# Patient Record
Sex: FEMALE | Race: BLACK OR AFRICAN AMERICAN | NOT HISPANIC OR LATINO | Employment: FULL TIME | URBAN - METROPOLITAN AREA
[De-identification: names, ages, dates, MRNs, and addresses within clinical notes are randomized per-mention and may not be internally consistent; named-entity substitution may affect disease eponyms.]

---

## 2018-04-19 ENCOUNTER — HOSPITAL ENCOUNTER (EMERGENCY)
Facility: HOSPITAL | Age: 58
Discharge: HOME | End: 2018-04-19
Attending: EMERGENCY MEDICINE
Payer: MEDICAID

## 2018-04-19 VITALS
HEIGHT: 62 IN | RESPIRATION RATE: 16 BRPM | WEIGHT: 135 LBS | DIASTOLIC BLOOD PRESSURE: 77 MMHG | HEART RATE: 71 BPM | TEMPERATURE: 98 F | OXYGEN SATURATION: 100 % | BODY MASS INDEX: 24.84 KG/M2 | SYSTOLIC BLOOD PRESSURE: 117 MMHG

## 2018-04-19 DIAGNOSIS — S00.81XA FACIAL ABRASION, INITIAL ENCOUNTER: ICD-10-CM

## 2018-04-19 DIAGNOSIS — R52 PAIN: ICD-10-CM

## 2018-04-19 DIAGNOSIS — S00.83XA FACIAL CONTUSION, INITIAL ENCOUNTER: Primary | ICD-10-CM

## 2018-04-19 PROCEDURE — 90471 IMMUNIZATION ADMIN: CPT | Performed by: PHYSICIAN ASSISTANT

## 2018-04-19 PROCEDURE — 63600175 PHARM REV CODE 636 W HCPCS: Performed by: PHYSICIAN ASSISTANT

## 2018-04-19 PROCEDURE — 90715 TDAP VACCINE 7 YRS/> IM: CPT | Performed by: PHYSICIAN ASSISTANT

## 2018-04-19 PROCEDURE — 25000003 PHARM REV CODE 250: Performed by: PHYSICIAN ASSISTANT

## 2018-04-19 PROCEDURE — 99283 EMERGENCY DEPT VISIT LOW MDM: CPT

## 2018-04-19 RX ORDER — KETOROLAC TROMETHAMINE 10 MG/1
10 TABLET, FILM COATED ORAL
Status: COMPLETED | OUTPATIENT
Start: 2018-04-19 | End: 2018-04-19

## 2018-04-19 RX ORDER — IBUPROFEN 600 MG/1
600 TABLET ORAL EVERY 6 HOURS PRN
Qty: 20 TABLET | Refills: 0 | Status: SHIPPED | OUTPATIENT
Start: 2018-04-19

## 2018-04-19 RX ADMIN — CLOSTRIDIUM TETANI TOXOID ANTIGEN (FORMALDEHYDE INACTIVATED), CORYNEBACTERIUM DIPHTHERIAE TOXOID ANTIGEN (FORMALDEHYDE INACTIVATED), BORDETELLA PERTUSSIS TOXOID ANTIGEN (GLUTARALDEHYDE INACTIVATED), BORDETELLA PERTUSSIS FILAMENTOUS HEMAGGLUTININ ANTIGEN (FORMALDEHYDE INACTIVATED), BORDETELLA PERTUSSIS PERTACTIN ANTIGEN, AND BORDETELLA PERTUSSIS FIMBRIAE 2/3 ANTIGEN 0.5 ML: 5; 2; 2.5; 5; 3; 5 INJECTION, SUSPENSION INTRAMUSCULAR at 11:04

## 2018-04-19 RX ADMIN — KETOROLAC TROMETHAMINE 10 MG: 10 TABLET, FILM COATED ORAL at 11:04

## 2018-04-19 NOTE — ED NOTES
Left periorbital facial swelling with abrasions to forehead, cheek and upper lip since Sunday after falling down stairs. Denies LOC.   APPEARANCE: Alert, oriented and in no acute distress.  CARDIAC: Normal rate and rhythm, no murmur heard.   PERIPHERAL VASCULAR: peripheral pulses present. Normal cap refill. No edema. Warm to touch.    RESPIRATORY:Normal rate and effort, breath sounds clear bilaterally throughout chest. Respirations are equal and unlabored no obvious signs of distress.  GASTRO: soft, bowel sounds normal, no tenderness, no abdominal distention.  MUSC: Full ROM. No bony tenderness or soft tissue tenderness. No obvious deformity.  SKIN: Skin is warm and dry, normal skin turgor, mucous membranes moist. Abrasions to left forehead, left cheek and upper lip since Sunday after falling down a few stairs.  NEURO: 5/5 strength major flexors/extensors bilaterally. Sensory intact to light touch bilaterally. Aron coma scale: eyes open spontaneously-4, oriented & converses-5, obeys commands-6. No neurological abnormalities.   MENTAL STATUS: awake, alert and aware of environment.  EYE: PERRL, both eyes: pupils brisk and reactive to light. Normal size.  ENT: EARS: no obvious drainage. NOSE: no active bleeding.

## 2018-04-19 NOTE — ED PROVIDER NOTES
Encounter Date: 2018       History     Chief Complaint   Patient presents with    Facial Injury     pt fell off the porch 2 days ago and hit the left side of her face. C/o scabs and bruising to left forehead and under left eye and bruising under both eyes     57 y.o. Female with no reported PMH presents for evaluation of injury. She reports that she had mechanical fall down steps as she was walking down her porch two days ago. She reports that she struck the left side of her face during that time sustaining  small abrasions. She denies any LOC and states pain was initially mild and that she was able to get up without assistance. She began with gradual bruising of the affected side and also reports sammy bruising of the right cheek as well. She state pain is a aching sensation that is exacerbated by palpation. She thought pain would improve by now but she still continues with pain. She denies any headache, dizziness, vision changes, epistasis, change in hearing, dental pain or difficulty swallowing. She denies any additional complaints at this time and has not tried any medications for the symptoms. She is unsure of last tetanus.       The history is provided by the patient.     Review of patient's allergies indicates:  No Known Allergies  History reviewed. No pertinent past medical history.  Past Surgical History:   Procedure Laterality Date     SECTION, CLASSIC       History reviewed. No pertinent family history.  Social History   Substance Use Topics    Smoking status: Current Every Day Smoker     Types: Cigarettes    Smokeless tobacco: Not on file    Alcohol use Yes     Review of Systems   Constitutional: Negative for chills and fever.   HENT: Positive for facial swelling. Negative for ear pain and trouble swallowing.    Eyes: Negative for pain, redness and visual disturbance.   Respiratory: Negative for shortness of breath.    Cardiovascular: Negative for chest pain.   Gastrointestinal: Negative  for nausea and vomiting.   Genitourinary: Negative for flank pain.   Musculoskeletal: Negative for arthralgias, back pain, myalgias, neck pain and neck stiffness.   Skin: Positive for color change and wound. Negative for rash.   Neurological: Negative for dizziness, weakness and headaches.   Hematological: Does not bruise/bleed easily.       Physical Exam     Initial Vitals [04/19/18 1023]   BP Pulse Resp Temp SpO2   117/77 71 16 98.4 °F (36.9 °C) 100 %      MAP       90.33         Physical Exam    Nursing note and vitals reviewed.  Constitutional: Vital signs are normal. She appears well-developed and well-nourished. She is cooperative.  Non-toxic appearance. She does not appear ill. No distress.   HENT:   Head: Head is with raccoon's eyes, with abrasion and with contusion. Head is without laceration.       Nose: No mucosal edema, sinus tenderness, septal deviation or nasal septal hematoma. No epistaxis.   Mouth/Throat:       EOMs intact; no bony tenderness, crepitus or instability. No signs of dental injury. FROM of TMJ    Eyes: Conjunctivae, EOM and lids are normal. Pupils are equal, round, and reactive to light.   Neck: Neck supple. No neck rigidity.   Cardiovascular: Normal rate and regular rhythm.   Pulmonary/Chest: Breath sounds normal. No respiratory distress. She has no wheezes. She has no rhonchi.   Abdominal: Soft. Normal appearance and bowel sounds are normal. There is no tenderness. There is no rigidity and no guarding.   Musculoskeletal: Normal range of motion.   Neurological: She is alert and oriented to person, place, and time. She has normal strength. No cranial nerve deficit or sensory deficit. Gait normal. GCS eye subscore is 4. GCS verbal subscore is 5. GCS motor subscore is 6.   Skin: Skin is warm and dry. Abrasion, bruising and ecchymosis noted. No rash noted.   Psychiatric: She has a normal mood and affect. Her speech is normal and behavior is normal. Thought content normal.         ED Course    Procedures  Labs Reviewed - No data to display      Imaging Results          X-Ray Facial Bones  3 Or More View (Final result)  Result time 04/19/18 11:35:37    Final result by Kim Sage MD (04/19/18 11:35:37)                 Impression:      No abnormality seen      Electronically signed by: Kim Sage MD  Date:    04/19/2018  Time:    11:35             Narrative:    EXAMINATION:  XR FACIAL BONES 3 OR MORE VIEW    CLINICAL HISTORY:  Pain, unspecified    TECHNIQUE:  Four views of the facial bones were performed.    COMPARISON:  None    FINDINGS:  No fracture, no osseous lesions.  The soft tissues appear normal.                                     Medical Decision Making:   Initial Assessment:   Well appearing female presents for evaluation of facial pain S/P trip and fall two days ago. She reports pain to the left face with noted abrasions and ecchymosis. No obvious bony deformity, bony tenderness on exam or crepitus. FROM of all articulations.   Differential Diagnosis:   Fracture, contusion, abrasion  Clinical Tests:   Radiological Study: Ordered and Reviewed  ED Management:  Ice and Toradol in the ED with subjective improvement in pain. Tdap updated in the ED. NO CT warranted at this time as per [place] CT rule. Low suspicion of acute bony abnormality however as she does have some edema and ecchymosis noted X-ray of facial bones ordered with no acute bony abnormality. Patient was encouraged to continue RICE therapy; instructed on wound care and NSAID's for pain and swelling. She was given head injury precautions although less likely as incident has now occurred over 48 hrs ago and patient with no focal neurodeficit. Strict instructions to follow up with primary care physician or reference provided for further assessment and evaluation. Given instructions to return for any acute symptoms and verbalized understanding of this medical plan.                Attending Attestation:     Physician  Attestation Statement for NP/PA:   I discussed this assessment and plan of this patient with the NP/PA, but I did not personally examine the patient. The face to face encounter was performed by the NP/PA.                     Clinical Impression:   The primary encounter diagnosis was Facial contusion, initial encounter. Diagnoses of Pain and Facial abrasion, initial encounter were also pertinent to this visit.                           MILLY Iqbal  04/24/18 [id number]       Braxton Bailey MD  04/26/18 5703

## 2022-02-13 ENCOUNTER — HOSPITAL ENCOUNTER (EMERGENCY)
Facility: HOSPITAL | Age: 62
Discharge: HOME | End: 2022-02-13
Attending: EMERGENCY MEDICINE
Payer: MEDICAID

## 2022-02-13 VITALS
DIASTOLIC BLOOD PRESSURE: 61 MMHG | TEMPERATURE: 98 F | OXYGEN SATURATION: 100 % | SYSTOLIC BLOOD PRESSURE: 100 MMHG | WEIGHT: 140 LBS | BODY MASS INDEX: 25.76 KG/M2 | HEART RATE: 68 BPM | RESPIRATION RATE: 16 BRPM | HEIGHT: 62 IN

## 2022-02-13 DIAGNOSIS — R22.0 LIP SWELLING: ICD-10-CM

## 2022-02-13 DIAGNOSIS — T78.40XA ALLERGIC REACTION, INITIAL ENCOUNTER: ICD-10-CM

## 2022-02-13 PROCEDURE — 96374 THER/PROPH/DIAG INJ IV PUSH: CPT

## 2022-02-13 PROCEDURE — 25000003 PHARM REV CODE 250: Performed by: EMERGENCY MEDICINE

## 2022-02-13 PROCEDURE — 99283 PR EMERGENCY DEPT VISIT,LEVEL III: ICD-10-PCS | Mod: ,,, | Performed by: EMERGENCY MEDICINE

## 2022-02-13 PROCEDURE — 96375 TX/PRO/DX INJ NEW DRUG ADDON: CPT

## 2022-02-13 PROCEDURE — 99283 EMERGENCY DEPT VISIT LOW MDM: CPT | Mod: ,,, | Performed by: EMERGENCY MEDICINE

## 2022-02-13 PROCEDURE — 63600175 PHARM REV CODE 636 W HCPCS: Performed by: EMERGENCY MEDICINE

## 2022-02-13 PROCEDURE — 99284 EMERGENCY DEPT VISIT MOD MDM: CPT | Mod: 25

## 2022-02-13 RX ORDER — METHYLPREDNISOLONE SOD SUCC 125 MG
125 VIAL (EA) INJECTION
Status: COMPLETED | OUTPATIENT
Start: 2022-02-13 | End: 2022-02-13

## 2022-02-13 RX ORDER — FAMOTIDINE 20 MG/1
20 TABLET, FILM COATED ORAL
Status: COMPLETED | OUTPATIENT
Start: 2022-02-13 | End: 2022-02-13

## 2022-02-13 RX ORDER — DIPHENHYDRAMINE HYDROCHLORIDE 50 MG/ML
25 INJECTION INTRAMUSCULAR; INTRAVENOUS
Status: COMPLETED | OUTPATIENT
Start: 2022-02-13 | End: 2022-02-13

## 2022-02-13 RX ORDER — METHYLPREDNISOLONE 4 MG/1
TABLET ORAL
Qty: 1 EACH | Refills: 0 | Status: SHIPPED | OUTPATIENT
Start: 2022-02-13

## 2022-02-13 RX ADMIN — METHYLPREDNISOLONE SODIUM SUCCINATE 125 MG: 125 INJECTION, POWDER, FOR SOLUTION INTRAMUSCULAR; INTRAVENOUS at 03:02

## 2022-02-13 RX ADMIN — DIPHENHYDRAMINE HYDROCHLORIDE 25 MG: 50 INJECTION INTRAMUSCULAR; INTRAVENOUS at 03:02

## 2022-02-13 RX ADMIN — FAMOTIDINE 20 MG: 20 TABLET ORAL at 03:02

## 2022-02-13 NOTE — Clinical Note
"Nora Denny AISHA" Noe was seen and treated in our emergency department on 2/13/2022.  She may return to work on 02/14/2022.       If you have any questions or concerns, please don't hesitate to call.      Nova Bangura RN    "

## 2022-02-13 NOTE — ED TRIAGE NOTES
Patient presents to the ER with complaints of lip swelling after having a piece of sausage about an hour ago. Patient denies any throat swelling or shortness of breath. Patient denies any pain at this time.

## 2022-02-13 NOTE — ED PROVIDER NOTES
Encounter Date: 2022    SCRIBE #1 NOTE: I, Kaitlin Ludwig, am scribing for, and in the presence of,  Danny Caldwell III, MD. I have scribed the following portions of the note - Other sections scribed: HPI, ROS.       History     Chief Complaint   Patient presents with    Allergic Reaction     Pt reports lower lip swelling after eating smoked sausage.  Pt denies dyspnea, dysphagia.     Ms. Liu is a 61-year-old who presents by personal transportation.  She complains of swelling to her lower lip that began approximately 30 minutes after eating smoked sausage.  The onset was an hour ago.  She has no history of food or medication allergies.  She denies tongue swelling, throat discomfort, dysphagia, cough, shortness of breath, wheezing, abdominal pain, nausea, and vomiting.  She did not take Benadryl or other medications prior to coming to the ED.  there are no modifying factors.    She has no past medical history on file.    The history is provided by the patient. No  was used.     Review of patient's allergies indicates:  No Known Allergies  History reviewed. No pertinent past medical history.  Past Surgical History:   Procedure Laterality Date     SECTION       SECTION, CLASSIC       History reviewed. No pertinent family history.  Social History     Tobacco Use    Smoking status: Current Every Day Smoker     Types: Cigarettes    Smokeless tobacco: Never Used   Substance Use Topics    Alcohol use: Yes     Review of Systems   HENT: Negative for sore throat and trouble swallowing.         Positive for swelling to the lower lip.   Respiratory: Negative for shortness of breath.    Gastrointestinal: Negative for abdominal pain, nausea and vomiting.   Musculoskeletal: Negative for arthralgias and myalgias.   Skin: Negative for rash.   Neurological: Negative for dizziness, light-headedness and headaches.       Physical Exam     Initial Vitals [22 1415]   BP Pulse Resp Temp  SpO2   138/80 70 16 97.9 °F (36.6 °C) 100 %      MAP       --         Physical Exam    Nursing note and vitals reviewed.  Constitutional: She is not diaphoretic. No distress.   HENT:   Head: Normocephalic and atraumatic.   Mouth/Throat: Uvula is midline and oropharynx is clear and moist. No oral lesions. No trismus in the jaw. No uvula swelling.   There is mild swelling without tenderness to the lower lip.  There is no swelling of the tongue or sublingual space.   Neck: No stridor present.   Cardiovascular: Normal rate, regular rhythm and normal heart sounds. Exam reveals no gallop and no friction rub.    No murmur heard.  Pulmonary/Chest: No stridor. No respiratory distress. She has no wheezes. She has no rhonchi. She has no rales.   Musculoskeletal:      Cervical back: No edema.     Neurological: She is alert and oriented to person, place, and time. GCS score is 15. GCS eye subscore is 4. GCS verbal subscore is 5. GCS motor subscore is 6.   Skin: Skin is warm and dry. No pallor.         ED Course   Procedures  Labs Reviewed - No data to display       Imaging Results    None          Medications   diphenhydrAMINE injection 25 mg (25 mg Intravenous Given 2/13/22 1524)   famotidine tablet 20 mg (20 mg Oral Given 2/13/22 1525)   methylPREDNISolone sodium succinate injection 125 mg (125 mg Intravenous Given 2/13/22 1524)     Medical Decision Making:   Clinical Tests:   Lab Tests: Ordered and Reviewed    MDM:  The patient underwent evaluation for acute swelling to her lower lip after eating a sausage.  She had no concerning symptoms for airway involvement.  She was treated with IV Benadryl, Pepcid, and Solu-Medrol.  She had improvement in her symptoms and was discharged.  She was prescribed a Medrol Dosepak discharging referred to Allergy and immunology for further evaluation.          Scribe Attestation:   Scribe #1: I performed the above scribed service and the documentation accurately describes the services I  performed. I attest to the accuracy of the note.    Attending Attestation:             Attending ED Notes:   Portions of this chart were completed by the scribe by interpretive transcription of statements made by the patient as a result of my questions at the bedside. Other portions were completed by the scribe from statements made by me for direct transcription into the medical record. Following completion of the charting by the scribe, I made modifications for both correctness and proper phrasing.      ED Course as of 02/14/22 0923   Sun Feb 13, 2022   1642 Subjectively, she feels that the swelling to her lip is improving.  It looks less swollen on exam as well. [LP]      ED Course User Index  [LP] Danny Caldwell III, MD             Clinical Impression:   Final diagnoses:  [T78.40XA] Allergic reaction, initial encounter  [R22.0] Lip swelling          ED Disposition Condition    Discharge Stable        ED Prescriptions     Medication Sig Dispense Start Date End Date Auth. Provider    methylPREDNISolone (MEDROL DOSEPACK) 4 mg tablet Follow dosing schedule on packaging. 1 each 2/13/2022  Danny Caldwell III, MD        Follow-up Information     Follow up With Specialties Details Why Contact Info    Judd Duarte III, MD Orthopedic Surgery  Schedule close follow-up with primary care provider. [address] [facility]  [facility] [id number] [phone]      [facility] Dept Emergency Medicine  Return to the ER as needed for any concerns. [id number]  [facility] [address] [phone]           Danny Caldwell III, MD  02/14/22 0907

## 2023-05-18 ENCOUNTER — HOSPITAL ENCOUNTER (EMERGENCY)
Facility: HOSPITAL | Age: 63
Discharge: HOME | End: 2023-05-18
Attending: EMERGENCY MEDICINE
Payer: MEDICAID

## 2023-05-18 VITALS
HEIGHT: 62 IN | DIASTOLIC BLOOD PRESSURE: 67 MMHG | BODY MASS INDEX: 23.92 KG/M2 | HEART RATE: 71 BPM | SYSTOLIC BLOOD PRESSURE: 108 MMHG | TEMPERATURE: 99 F | RESPIRATION RATE: 16 BRPM | OXYGEN SATURATION: 97 % | WEIGHT: 130 LBS

## 2023-05-18 DIAGNOSIS — K86.9 PANCREATIC LESION: ICD-10-CM

## 2023-05-18 DIAGNOSIS — R93.5 ABNORMAL CT OF THE ABDOMEN: ICD-10-CM

## 2023-05-18 DIAGNOSIS — R16.0 HEPATOMEGALY: ICD-10-CM

## 2023-05-18 DIAGNOSIS — R50.9 FEVER: ICD-10-CM

## 2023-05-18 DIAGNOSIS — J18.9 PNEUMONIA OF BOTH LOWER LOBES DUE TO INFECTIOUS ORGANISM: Primary | ICD-10-CM

## 2023-05-18 LAB
ALBUMIN SERPL BCP-MCNC: 3.7 G/DL (ref 3.5–5.2)
ALP SERPL-CCNC: 97 U/L (ref 55–135)
ALT SERPL W/O P-5'-P-CCNC: 16 U/L (ref 10–44)
ANION GAP SERPL CALC-SCNC: 10 MMOL/L (ref 8–16)
AST SERPL-CCNC: 30 U/L (ref 10–40)
BASOPHILS # BLD AUTO: 0.05 K/UL (ref 0–0.2)
BASOPHILS NFR BLD: 0.6 % (ref 0–1.9)
BILIRUB SERPL-MCNC: 0.7 MG/DL (ref 0.1–1)
BILIRUB UR QL STRIP: NEGATIVE
BUN SERPL-MCNC: 6 MG/DL (ref 6–20)
CALCIUM SERPL-MCNC: 9.6 MG/DL (ref 8.7–10.5)
CHLORIDE SERPL-SCNC: 104 MMOL/L (ref 95–110)
CLARITY UR REFRACT.AUTO: ABNORMAL
CO2 SERPL-SCNC: 25 MMOL/L (ref 23–29)
COLOR UR AUTO: ABNORMAL
CREAT SERPL-MCNC: 0.7 MG/DL (ref 0.5–1.4)
CTP QC/QA: YES
DIFFERENTIAL METHOD: ABNORMAL
EOSINOPHIL # BLD AUTO: 0 K/UL (ref 0–0.5)
EOSINOPHIL NFR BLD: 0.4 % (ref 0–8)
ERYTHROCYTE [DISTWIDTH] IN BLOOD BY AUTOMATED COUNT: 13 % (ref 11.5–14.5)
EST. GFR  (NO RACE VARIABLE): >60 ML/MIN/1.73 M^2
GLUCOSE SERPL-MCNC: 117 MG/DL (ref 70–110)
GLUCOSE UR QL STRIP: NEGATIVE
HCT VFR BLD AUTO: 37.8 % (ref 37–48.5)
HCV AB SERPL QL IA: NORMAL
HGB BLD-MCNC: 12.6 G/DL (ref 12–16)
HGB UR QL STRIP: NEGATIVE
HIV 1+2 AB+HIV1 P24 AG SERPL QL IA: NORMAL
IMM GRANULOCYTES # BLD AUTO: 0.03 K/UL (ref 0–0.04)
IMM GRANULOCYTES NFR BLD AUTO: 0.4 % (ref 0–0.5)
KETONES UR QL STRIP: NEGATIVE
LACTATE SERPL-SCNC: 1.4 MMOL/L (ref 0.5–2.2)
LEUKOCYTE ESTERASE UR QL STRIP: NEGATIVE
LYMPHOCYTES # BLD AUTO: 0.9 K/UL (ref 1–4.8)
LYMPHOCYTES NFR BLD: 11.3 % (ref 18–48)
MAGNESIUM SERPL-MCNC: 1.7 MG/DL (ref 1.6–2.6)
MCH RBC QN AUTO: 33 PG (ref 27–31)
MCHC RBC AUTO-ENTMCNC: 33.3 G/DL (ref 32–36)
MCV RBC AUTO: 99 FL (ref 82–98)
MONOCYTES # BLD AUTO: 0.7 K/UL (ref 0.3–1)
MONOCYTES NFR BLD: 8.4 % (ref 4–15)
NEUTROPHILS # BLD AUTO: 6.5 K/UL (ref 1.8–7.7)
NEUTROPHILS NFR BLD: 78.9 % (ref 38–73)
NITRITE UR QL STRIP: NEGATIVE
NRBC BLD-RTO: 0 /100 WBC
PH UR STRIP: 6 [PH] (ref 5–8)
PLATELET # BLD AUTO: 130 K/UL (ref 150–450)
PMV BLD AUTO: 9.6 FL (ref 9.2–12.9)
POTASSIUM SERPL-SCNC: 3.3 MMOL/L (ref 3.5–5.1)
PROT SERPL-MCNC: 7.4 G/DL (ref 6–8.4)
PROT UR QL STRIP: NEGATIVE
RBC # BLD AUTO: 3.82 M/UL (ref 4–5.4)
SARS-COV-2 RDRP RESP QL NAA+PROBE: NEGATIVE
SARS-COV-2 RDRP RESP QL NAA+PROBE: NORMAL
SODIUM SERPL-SCNC: 139 MMOL/L (ref 136–145)
SP GR UR STRIP: 1.02 (ref 1–1.03)
URN SPEC COLLECT METH UR: ABNORMAL
WBC # BLD AUTO: 8.2 K/UL (ref 3.9–12.7)

## 2023-05-18 PROCEDURE — 96375 TX/PRO/DX INJ NEW DRUG ADDON: CPT

## 2023-05-18 PROCEDURE — 83605 ASSAY OF LACTIC ACID: CPT | Performed by: PHYSICIAN ASSISTANT

## 2023-05-18 PROCEDURE — 80053 COMPREHEN METABOLIC PANEL: CPT | Performed by: PHYSICIAN ASSISTANT

## 2023-05-18 PROCEDURE — 83735 ASSAY OF MAGNESIUM: CPT | Performed by: PHYSICIAN ASSISTANT

## 2023-05-18 PROCEDURE — 96374 THER/PROPH/DIAG INJ IV PUSH: CPT | Mod: 59

## 2023-05-18 PROCEDURE — 25000003 PHARM REV CODE 250: Performed by: PHYSICIAN ASSISTANT

## 2023-05-18 PROCEDURE — 81003 URINALYSIS AUTO W/O SCOPE: CPT | Performed by: PHYSICIAN ASSISTANT

## 2023-05-18 PROCEDURE — 85025 COMPLETE CBC W/AUTO DIFF WBC: CPT | Performed by: PHYSICIAN ASSISTANT

## 2023-05-18 PROCEDURE — U0002 COVID-19 LAB TEST NON-CDC: HCPCS | Performed by: PHYSICIAN ASSISTANT

## 2023-05-18 PROCEDURE — 99284 EMERGENCY DEPT VISIT MOD MDM: CPT | Mod: ,,, | Performed by: EMERGENCY MEDICINE

## 2023-05-18 PROCEDURE — 25500020 PHARM REV CODE 255: Performed by: EMERGENCY MEDICINE

## 2023-05-18 PROCEDURE — 99285 EMERGENCY DEPT VISIT HI MDM: CPT | Mod: 25

## 2023-05-18 PROCEDURE — 63600175 PHARM REV CODE 636 W HCPCS: Performed by: PHYSICIAN ASSISTANT

## 2023-05-18 PROCEDURE — 87389 HIV-1 AG W/HIV-1&-2 AB AG IA: CPT | Performed by: PHYSICIAN ASSISTANT

## 2023-05-18 PROCEDURE — 86803 HEPATITIS C AB TEST: CPT | Performed by: PHYSICIAN ASSISTANT

## 2023-05-18 PROCEDURE — 99284 PR EMERGENCY DEPT VISIT,LEVEL IV: ICD-10-PCS | Mod: ,,, | Performed by: EMERGENCY MEDICINE

## 2023-05-18 RX ORDER — HYDROCODONE BITARTRATE AND ACETAMINOPHEN 5; 325 MG/1; MG/1
1 TABLET ORAL EVERY 6 HOURS PRN
Qty: 11 TABLET | Refills: 0 | Status: SHIPPED | OUTPATIENT
Start: 2023-05-18 | End: 2023-05-21

## 2023-05-18 RX ORDER — AMOXICILLIN AND CLAVULANATE POTASSIUM 875; 125 MG/1; MG/1
1 TABLET, FILM COATED ORAL 2 TIMES DAILY
Qty: 14 TABLET | Refills: 0 | Status: SHIPPED | OUTPATIENT
Start: 2023-05-18

## 2023-05-18 RX ORDER — MORPHINE SULFATE 4 MG/ML
4 INJECTION, SOLUTION INTRAMUSCULAR; INTRAVENOUS
Status: COMPLETED | OUTPATIENT
Start: 2023-05-18 | End: 2023-05-18

## 2023-05-18 RX ORDER — ONDANSETRON 4 MG/1
4 TABLET, ORALLY DISINTEGRATING ORAL EVERY 6 HOURS PRN
Qty: 12 TABLET | Refills: 0 | Status: SHIPPED | OUTPATIENT
Start: 2023-05-18

## 2023-05-18 RX ORDER — ONDANSETRON 2 MG/ML
4 INJECTION INTRAMUSCULAR; INTRAVENOUS
Status: COMPLETED | OUTPATIENT
Start: 2023-05-18 | End: 2023-05-18

## 2023-05-18 RX ORDER — ACETAMINOPHEN 500 MG
1000 TABLET ORAL
Status: COMPLETED | OUTPATIENT
Start: 2023-05-18 | End: 2023-05-18

## 2023-05-18 RX ADMIN — MORPHINE SULFATE 4 MG: 4 INJECTION INTRAVENOUS at 05:05

## 2023-05-18 RX ADMIN — ACETAMINOPHEN 1000 MG: 500 TABLET ORAL at 05:05

## 2023-05-18 RX ADMIN — ONDANSETRON 4 MG: 2 INJECTION INTRAMUSCULAR; INTRAVENOUS at 05:05

## 2023-05-18 RX ADMIN — IOHEXOL 75 ML: 350 INJECTION, SOLUTION INTRAVENOUS at 07:05

## 2023-05-18 NOTE — ED NOTES
Nora Liu, a 62 y.o. female presents to the ED w/ complaint of r flank pain    Triage note:  Chief Complaint   Patient presents with    Leg Pain     R flank pain, hip and leg pain , started today     Review of patient's allergies indicates:  No Known Allergies  No past medical history on file.    LOC: Patient name and date of birth verified. The patient is awake, alert and aware of environment with an appropriate affect, the patient is oriented x 3 and speaking appropriately.   APPEARANCE: Patient resting comfortably, patient is clean and well groomed, patient's clothing is properly fastened.  SKIN: The skin is warm and dry, color consistent with ethnicity, patient has normal skin turgor and moist mucus membranes, skin intact, no breakdown or bruising noted.  MUSCULOSKELETAL: Patient moving all extremities well, no obvious swelling or deformities noted.   RESPIRATORY: Respirations are spontaneous, patient has a normal effort and rate, no accessory muscle use noted.  CARDIAC: Patient has a normal rate and rhythm, no periphreal edema noted, capillary refill < 3 seconds.  ABDOMEN: Soft and non tender to palpation, no distention noted. Bowel sounds present in all four quadrants.  NEUROLOGIC: Eyes open spontaneously, behavior appropriate to situation, follows commands, facial expression symmetrical, bilateral hand grasp equal and even, purposeful motor response noted, normal sensation in all extremities when touched with a finger.

## 2023-05-18 NOTE — ED PROVIDER NOTES
Encounter Date: 2023       History     Chief Complaint   Patient presents with    Leg Pain     R flank pain, hip and leg pain , started today     The history is provided by the patient and medical records. No  was used.     Nora Liu is a 62 y.o. female with medical history of tobacco use presenting to the ED with the chief complaint of abdominal pain.     Reports LLQ abdominal pain and L hip pain beginning yesterday. Pain worsens with walking. Reports associated constipation and feels like her abdomen is distended. She is febrile in triage. Abdominal surgical history of  x1. Denies hip or back surgeries. Drinks 5 beers per day. Smokes 1/4 PPD. No headache, sore throat, sinus congestion, cough, chest pain, SOB, dysuria, hematuria. No history of IVDU.      Review of patient's allergies indicates:  No Known Allergies  History reviewed. No pertinent past medical history.  Past Surgical History:   Procedure Laterality Date     SECTION       SECTION, CLASSIC       History reviewed. No pertinent family history.  Social History     Tobacco Use    Smoking status: Every Day     Types: Cigarettes    Smokeless tobacco: Never   Substance Use Topics    Alcohol use: Yes     Review of Systems   Gastrointestinal:  Positive for abdominal pain.     Physical Exam     Initial Vitals [23 1602]   BP Pulse Resp Temp SpO2   (!) 145/65 93 18 (!) 100.6 °F (38.1 °C) 100 %      MAP       --         Physical Exam    Constitutional: She appears well-developed and well-nourished. She is not diaphoretic. No distress.   HENT:   Head: Normocephalic and atraumatic.   Mouth/Throat: Oropharynx is clear and moist. No oropharyngeal exudate.   Eyes: Conjunctivae and EOM are normal. Pupils are equal, round, and reactive to light. No scleral icterus.   Neck: Neck supple.   Normal range of motion.  Cardiovascular:  Normal rate and regular rhythm.           +2 DP pulses   Pulmonary/Chest: Breath  sounds normal. No respiratory distress. She has no wheezes.   Abdominal: Abdomen is soft. She exhibits distension. There is no abdominal tenderness. There is no rebound.   Musculoskeletal:         General: No tenderness or edema. Normal range of motion.      Cervical back: Normal range of motion and neck supple.      Comments: No midline spinal tenderness. Pain reproducible with L hip flexion. No overlying skin changes or rash.     Neurological: She is alert and oriented to person, place, and time. She has normal strength. No sensory deficit.   No focal weakness or sensory deficit   Skin: Skin is warm and dry. No rash noted. No erythema.   Psychiatric: She has a normal mood and affect.       ED Course   Procedures  Labs Reviewed   CBC W/ AUTO DIFFERENTIAL - Abnormal; Notable for the following components:       Result Value    RBC 3.82 (*)     MCV 99 (*)     MCH 33.0 (*)     Platelets 130 (*)     Lymph # 0.9 (*)     Gran % 78.9 (*)     Lymph % 11.3 (*)     All other components within normal limits   COMPREHENSIVE METABOLIC PANEL - Abnormal; Notable for the following components:    Potassium 3.3 (*)     Glucose 117 (*)     All other components within normal limits   URINALYSIS, REFLEX TO URINE CULTURE - Abnormal; Notable for the following components:    Appearance, UA Hazy (*)     All other components within normal limits    Narrative:     Specimen Source->Urine   HIV 1 / 2 ANTIBODY    Narrative:     Release to patient->Immediate   HEPATITIS C ANTIBODY    Narrative:     Release to patient->Immediate   MAGNESIUM   LACTIC ACID, PLASMA   SARS-COV-2 RNA AMPLIFICATION, QUAL   SARS-COV-2 RDRP GENE          Imaging Results               CT Abdomen Pelvis With Contrast (Final result)  Result time 05/18/23 20:59:46      Final result by Tyrell Adkins MD (05/18/23 20:59:46)                   Impression:      Mild wall enhancement of the distal colon with liquid stool, which be seen in diarrheal illnesses.  No significant  pericolonic inflammatory changes.  No bowel obstruction.    Bibasilar scattered ground-glass opacities and nodules, suggestive of infectious/inflammatory etiologies including atypical bacterial or viral process is COVID pneumonia in the proper clinical setting.    Mild dilatation of the common bile duct and proximal pancreatic duct. Indeterminate 0.4 cm hypodensity in the pancreatic head. Can obtain MRCP for further evaluation.    Scattered diverticuli without evidence of diverticulitis.    Hepatomegaly.  Relatively hypoattenuating liver, which be seen in setting of steatosis, noting limited sensitivity on contrast enhanced study.  Recommend correlation with LFTs.    Mild wall enhancement and distal dilatation of the appendix without periappendiceal inflammatory changes.  Appendicitis is felt unlikely.    Few additional findings as above.    This report was flagged in Epic as abnormal..    Electronically signed by resident: Aleksandr Smallwood  Date:    05/18/2023  Time:    19:34    Electronically signed by: Tyrell Adkins MD  Date:    05/18/2023  Time:    20:59               Narrative:    EXAMINATION:  CT ABDOMEN PELVIS WITH CONTRAST    CLINICAL HISTORY:  Abdominal abscess/infection suspected;LLQ/L hip pain worsens with hip flexion, fever, abd distended;    TECHNIQUE:  The patient was surveyed from the lung bases through the pelvis after the administration of 75 cc Omni 350 IV contrast. No oral contrast was administered. The data was reconstructed for coronal, sagittal, and axial images.    COMPARISON:  None.    FINDINGS:  CHEST:    Lungs/Pleura: Bilateral patchy subsegmental ground-glass opacities and nodules with a somewhat peripheral distribution.  Ground-glass nodules measure up to 0.3 cm bilaterally.  Partially image few lung cysts within the left lower lobe.  No focal consolidation or pleural effusion.  No traction bronchiectasis or honeycombing.  Few scattered linear opacities in the right middle lobe and lingula  consistent with minimal platelike scarring versus atelectasis.    Heart: Normal size.  No pericardial effusion.    Thoracic soft tissues: No significant abnormality.    ABDOMEN/PELVIS:    Liver: Enlarged measuring 19.7 cm.  Diffuse hypoattenuation relative to the spleen.  No focal abnormality.  Portal vasculature is patent.    Gallbladder: Normally distended without calcified gallstones.  No pericholecystic inflammatory changes.    Bile ducts: Common bile duct is dilated measuring 1.0 cm.  No intrahepatic ductal dilatation.    Spleen: Normal size.    Pancreas: Pancreatic ductal dilatation of the proximal half of the pancreatic duct measuring up to 0.5 cm.  0.4 cm hypodensity in the pancreatic head.  No peripancreatic fat stranding.    Adrenals: No significant abnormality.    Renal/Ureters: The kidneys are normal in size and enhance symmetrically.  No hydroureteronephrosis.  The ureters are normal in course and caliber. The urinary bladder is partially distended with smooth wall contours.    Reproductive: Uterus is without significant abnormality. No adnexal mass.    Stomach/Bowel: Stomach is without significant abnormality.  Small bowel is normal caliber without obstruction or inflammation.  Appendix takes a pelvic course and is demonstrates minimal wall enhancement and is slightly dilated at its distal portion measuring up to 0.8 cm.  No periappendiceal inflammatory changes..  Fatty deposition within the cecal and ascending colon wall.  Minimal wall enhancement of extending from the distal descending colon to the rectum.  No significant pericolonic or perirectal inflammatory changes.  No obstruction.  Mild amount of liquid stool throughout the colon.  Scattered diverticuli without evidence of diverticulitis.    Peritoneum: No free fluid. No intraperitoneal free air.    Lymph Nodes: No pathologic linda enlargement in the abdomen or pelvis.    Vasculature: Abdominal aorta is normal in caliber.  Minimal  atherosclerosis of the abdominal aorta and its branches.    Bones: Minimal degenerative changes of the spine.  No significant degenerative changes of the bilateral hip joints.  No acute fractures or osseous destructive lesions.    Soft Tissues: Iliopsoas and psoas muscles are symmetric in appearance.  Small fat containing umbilical hernia.  Several prominent but nonenlarged bilateral inguinal lymph nodes, larger and more in number on the left, nonspecific.                                       X-Ray Chest PA And Lateral (Final result)  Result time 05/18/23 17:23:51      Final result by Flako Espinosa MD (05/18/23 17:23:51)                   Impression:      No acute abnormality.      Electronically signed by: Flako Espinosa  Date:    05/18/2023  Time:    17:23               Narrative:    EXAMINATION:  XR CHEST PA AND LATERAL    CLINICAL HISTORY:  Fever, unspecified    TECHNIQUE:  PA and lateral views of the chest were performed.    COMPARISON:  None    FINDINGS:  Minimal elevation the left hemidiaphragm.    The lungs are clear, with normal appearance of pulmonary vasculature and no pleural effusion or pneumothorax.    The cardiac silhouette is normal in size. The hilar and mediastinal contours are unremarkable.    Bones are intact.                                       Medications   morphine injection 4 mg (4 mg Intravenous Given 5/18/23 1719)   ondansetron injection 4 mg (4 mg Intravenous Given 5/18/23 1718)   acetaminophen tablet 1,000 mg (1,000 mg Oral Given 5/18/23 1716)   iohexoL (OMNIPAQUE 350) injection 75 mL (75 mLs Intravenous Given 5/18/23 1918)     Medical Decision Making:   History:   Old Medical Records: I decided to obtain old medical records.  Old Records Summarized: records from clinic visits and records from previous admission(s).  Initial Assessment:   62 y.o. female with medical history of tobacco use presenting to the ED c/o LLQ abd pain and L hip pain beginning yesterday. Febrile 100.6 in  triage.   Differential Diagnosis:   Complicated UTI, intra-abdominal abscess, colitis, diverticulitis, infectious synovitis, malignancy. She has good ROM of her hip and no past hip surgeries. Lower suspicion for septic arthritis. No lower extremity swelling and do not suspect DVT.   Clinical Tests:   Lab Tests: Ordered and Reviewed  Radiological Study: Ordered and Reviewed  ED Management:  Emergency room management documented as below or in the ED course.           ED Course as of 05/18/23 2140   Thu May 18, 2023   2116 SARS-CoV-2 RNA, Amplification, Qual: Negative [BA]   2116 Leukocytes, UA: Negative [BA]   2116 NITRITE UA: Negative [BA]   2116 Creatinine: 0.7 [BA]   2116 WBC: 8.20 [BA]   2116 CXR without large consolidation, pleural effusion, or pneumothorax on my read. [BA]   2138 CT abd/pelv with multiple findings. Mild distal colon wall enhancement suggestive of diarrheal illness. Bibasilar ground-glass opacities and nodules noted. Mild CBD dilation and hypodensity noted a pancreatic head. Diverulosis. Hepatomegaly. Distal dilation of appendix without periappendiceal inflammatory changes.  [BA]   2138 Patient resting comfortably on reassessment. Fever has resolved. Will cover for underlying pneumonia given CT findings. PSI score 50, risk class II and outpatient management appropriate. RX for Augmentin provided which will cover for possible colitis given colon diarrheal findings. Discussed pancreatic lesion and need for MRCP as outpatient. Patient expresses understanding and agreeable to the plan. Return to ED precautions given for new, worsening, or concerning symptoms. I have discussed the care of this patient with my supervising physician.  [BA]      ED Course User Index  [BA] Tyrell Patterson PA-C                 Clinical Impression:   Final diagnoses:  [R50.9] Fever  [R16.0] Hepatomegaly  [K86.9] Pancreatic lesion  [J18.9] Pneumonia of both lower lobes due to infectious organism (Primary)  [R93.5] Abnormal  CT of the abdomen        ED Disposition Condition    Discharge Stable          ED Prescriptions       Medication Sig Dispense Start Date End Date Auth. Provider    amoxicillin-clavulanate 875-125mg (AUGMENTIN) 875-125 mg per tablet Take 1 tablet by mouth 2 (two) times daily. 14 tablet 5/18/2023 -- Tyrell Patterson PA-C    HYDROcodone-acetaminophen (NORCO) 5-325 mg per tablet Take 1 tablet by mouth every 6 (six) hours as needed for Pain. 11 tablet 5/18/2023 5/21/2023 Tyrell Patterson PA-C    ondansetron (ZOFRAN-ODT) 4 MG TbDL Take 1 tablet (4 mg total) by mouth every 6 (six) hours as needed. 12 tablet 5/18/2023 -- Tyrell Patterson PA-C          Follow-up Information       Follow up With Specialties Details Why Contact Info    Judd Duarte III, MD Orthopedic Surgery   [address] [facility]  [facility] [id number] [phone]               Tyrell Patterson PA-C  05/18/23 8098

## 2023-05-19 NOTE — DISCHARGE INSTRUCTIONS
Take the prescribed Augmentin as directed for treatment of possible pneumonia.     You had an incidental lesion on your pancreas that you need to have an outpatient MRCP for further evaluation.     Follow-up with your primary care provider for further evaluation.     Imaging Results               CT Abdomen Pelvis With Contrast (Final result)  Result time 05/18/23 20:59:46      Final result by Tyrell Adkins MD (05/18/23 20:59:46)                   Impression:      Mild wall enhancement of the distal colon with liquid stool, which be seen in diarrheal illnesses.  No significant pericolonic inflammatory changes.  No bowel obstruction.    Bibasilar scattered ground-glass opacities and nodules, suggestive of infectious/inflammatory etiologies including atypical bacterial or viral process is COVID pneumonia in the proper clinical setting.    Mild dilatation of the common bile duct and proximal pancreatic duct. Indeterminate 0.4 cm hypodensity in the pancreatic head. Can obtain MRCP for further evaluation.    Scattered diverticuli without evidence of diverticulitis.    Hepatomegaly.  Relatively hypoattenuating liver, which be seen in setting of steatosis, noting limited sensitivity on contrast enhanced study.  Recommend correlation with LFTs.    Mild wall enhancement and distal dilatation of the appendix without periappendiceal inflammatory changes.  Appendicitis is felt unlikely.    Few additional findings as above.    This report was flagged in Epic as abnormal..    Electronically signed by resident: Aleksandr Smallwood  Date:    05/18/2023  Time:    19:34    Electronically signed by: Tyrell Adkins MD  Date:    05/18/2023  Time:    20:59               Narrative:    EXAMINATION:  CT ABDOMEN PELVIS WITH CONTRAST    CLINICAL HISTORY:  Abdominal abscess/infection suspected;LLQ/L hip pain worsens with hip flexion, fever, abd distended;    TECHNIQUE:  The patient was surveyed from the lung bases through the pelvis after the  administration of 75 cc Omni 350 IV contrast. No oral contrast was administered. The data was reconstructed for coronal, sagittal, and axial images.    COMPARISON:  None.    FINDINGS:  CHEST:    Lungs/Pleura: Bilateral patchy subsegmental ground-glass opacities and nodules with a somewhat peripheral distribution.  Ground-glass nodules measure up to 0.3 cm bilaterally.  Partially image few lung cysts within the left lower lobe.  No focal consolidation or pleural effusion.  No traction bronchiectasis or honeycombing.  Few scattered linear opacities in the right middle lobe and lingula consistent with minimal platelike scarring versus atelectasis.    Heart: Normal size.  No pericardial effusion.    Thoracic soft tissues: No significant abnormality.    ABDOMEN/PELVIS:    Liver: Enlarged measuring 19.7 cm.  Diffuse hypoattenuation relative to the spleen.  No focal abnormality.  Portal vasculature is patent.    Gallbladder: Normally distended without calcified gallstones.  No pericholecystic inflammatory changes.    Bile ducts: Common bile duct is dilated measuring 1.0 cm.  No intrahepatic ductal dilatation.    Spleen: Normal size.    Pancreas: Pancreatic ductal dilatation of the proximal half of the pancreatic duct measuring up to 0.5 cm.  0.4 cm hypodensity in the pancreatic head.  No peripancreatic fat stranding.    Adrenals: No significant abnormality.    Renal/Ureters: The kidneys are normal in size and enhance symmetrically.  No hydroureteronephrosis.  The ureters are normal in course and caliber. The urinary bladder is partially distended with smooth wall contours.    Reproductive: Uterus is without significant abnormality. No adnexal mass.    Stomach/Bowel: Stomach is without significant abnormality.  Small bowel is normal caliber without obstruction or inflammation.  Appendix takes a pelvic course and is demonstrates minimal wall enhancement and is slightly dilated at its distal portion measuring up to 0.8 cm.   No periappendiceal inflammatory changes..  Fatty deposition within the cecal and ascending colon wall.  Minimal wall enhancement of extending from the distal descending colon to the rectum.  No significant pericolonic or perirectal inflammatory changes.  No obstruction.  Mild amount of liquid stool throughout the colon.  Scattered diverticuli without evidence of diverticulitis.    Peritoneum: No free fluid. No intraperitoneal free air.    Lymph Nodes: No pathologic linda enlargement in the abdomen or pelvis.    Vasculature: Abdominal aorta is normal in caliber.  Minimal atherosclerosis of the abdominal aorta and its branches.    Bones: Minimal degenerative changes of the spine.  No significant degenerative changes of the bilateral hip joints.  No acute fractures or osseous destructive lesions.    Soft Tissues: Iliopsoas and psoas muscles are symmetric in appearance.  Small fat containing umbilical hernia.  Several prominent but nonenlarged bilateral inguinal lymph nodes, larger and more in number on the left, nonspecific.                                       X-Ray Chest PA And Lateral (Final result)  Result time 05/18/23 17:23:51      Final result by Flako Espinosa MD (05/18/23 17:23:51)                   Impression:      No acute abnormality.      Electronically signed by: Flako Espinosa  Date:    05/18/2023  Time:    17:23               Narrative:    EXAMINATION:  XR CHEST PA AND LATERAL    CLINICAL HISTORY:  Fever, unspecified    TECHNIQUE:  PA and lateral views of the chest were performed.    COMPARISON:  None    FINDINGS:  Minimal elevation the left hemidiaphragm.    The lungs are clear, with normal appearance of pulmonary vasculature and no pleural effusion or pneumothorax.    The cardiac silhouette is normal in size. The hilar and mediastinal contours are unremarkable.    Bones are intact.